# Patient Record
Sex: MALE | Race: WHITE | NOT HISPANIC OR LATINO | URBAN - METROPOLITAN AREA
[De-identification: names, ages, dates, MRNs, and addresses within clinical notes are randomized per-mention and may not be internally consistent; named-entity substitution may affect disease eponyms.]

---

## 2024-04-03 ENCOUNTER — NURSING HOME VISIT (OUTPATIENT)
Dept: POST ACUTE CARE | Facility: EXTERNAL LOCATION | Age: 69
End: 2024-04-03
Payer: COMMERCIAL

## 2024-04-03 DIAGNOSIS — C78.7 PROSTATE CANCER METASTATIC TO LIVER (MULTI): ICD-10-CM

## 2024-04-03 DIAGNOSIS — J44.9 CHRONIC OBSTRUCTIVE PULMONARY DISEASE, UNSPECIFIED COPD TYPE (MULTI): ICD-10-CM

## 2024-04-03 DIAGNOSIS — T40.2X5A CONSTIPATION DUE TO OPIOID THERAPY: ICD-10-CM

## 2024-04-03 DIAGNOSIS — C61 PROSTATE CANCER METASTATIC TO LIVER (MULTI): ICD-10-CM

## 2024-04-03 DIAGNOSIS — E44.0 MODERATE PROTEIN-CALORIE MALNUTRITION (MULTI): ICD-10-CM

## 2024-04-03 DIAGNOSIS — C79.31 METASTASIS TO BRAIN (MULTI): ICD-10-CM

## 2024-04-03 DIAGNOSIS — K59.03 CONSTIPATION DUE TO OPIOID THERAPY: ICD-10-CM

## 2024-04-03 DIAGNOSIS — R53.83 LETHARGY: ICD-10-CM

## 2024-04-03 DIAGNOSIS — R62.7 FAILURE TO THRIVE IN ADULT: Primary | ICD-10-CM

## 2024-04-03 DIAGNOSIS — F31.31 BIPOLAR 1 DISORDER, DEPRESSED, MILD (MULTI): ICD-10-CM

## 2024-04-03 DIAGNOSIS — R53.1 WEAKNESS: ICD-10-CM

## 2024-04-03 DIAGNOSIS — D64.9 ANEMIA, UNSPECIFIED TYPE: ICD-10-CM

## 2024-04-03 DIAGNOSIS — I10 HYPERTENSION, UNSPECIFIED TYPE: ICD-10-CM

## 2024-04-03 DIAGNOSIS — E78.5 HYPERLIPIDEMIA, UNSPECIFIED HYPERLIPIDEMIA TYPE: ICD-10-CM

## 2024-04-03 DIAGNOSIS — E03.9 HYPOTHYROIDISM, UNSPECIFIED TYPE: ICD-10-CM

## 2024-04-03 PROCEDURE — 99310 SBSQ NF CARE HIGH MDM 45: CPT | Performed by: NURSE PRACTITIONER

## 2024-04-03 NOTE — LETTER
Patient: Jose Antonio Gonzales  : 1955    Encounter Date: 2024    Chief Complaint:   SNF F/U  Generalized weakness  FTT  Metastatic prostate cancer     HPI:   68 year-old male presenting to the ER on 3/26/24 with c/o generalized weakness and failure to thrive. He was admitted to the hospital for further evaluation and treatment. Hospital course:    FTT/generalized weakness-oncology consult, palliative care consult, MRI of brain and spine showed new 7 mm enhancing mass in the basal posterior left temporal region and mild surrounding vasogenic edema, MRI of lumbar spine showing small lesion in L1 vertebral body, neurology consulted with no new recommendations, F/U with oncology and radiation oncology after discharge, PT/OT recommending SNF  Insomnia-trazodone prn   Metastatic prostate cancer-known mets to liver and brain, oncology following, F/U with radiation oncology as scheduled  Bipolar disorder-c/w lithium, seroquel, and zyprexa   Hypothyroidism-c/w levothyroxine, endo consult, monitor thyroid studies, F/U with endo after discharge  Anemia-monitor CBC  Abdominal pain-XR noted with fecal burden, bowel regimen started  Moderate protein-calorie malnutrition-RD consult    Pt. was HDS and discharged to [facility] on 24. Today, patient's wife is at the bedside and reports that patient is very tired and lethargic compared to yesterday. He was unable to participate in therapy today. He denies any dizziness, vision changes, SOB, cough, or chest pain. He reports generalized pain. Wife reports that she brought in patient's medications from home and left them at the beside and thinks that patient may have taken his Trazodone when he was already receiving the Trazodone from the facility. Pt. is unable to state whether or not this happened. No other clinical concerns noted at this time.     ROS:    As above in HPI. Otherwise, all other systems have been reviewed and are negative for complaint.    Medications  reviewed and verified in NH chart.     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)   • Moderate protein-calorie malnutrition (Multi)   • Lymphadenopathy        Past Medical History:   Diagnosis Date   • Colon polyps    • Inguinal hernia    • Pneumonia        Past Surgical History:   Procedure Laterality Date   • APPENDECTOMY     • COLONOSCOPY     • ESOPHAGOGASTRODUODENOSCOPY     • HERNIA REPAIR      Inguinal hernia repair   • POLYPECTOMY     • PROSTATECTOMY  01/31/2014   • RIB BIOPSY      R 8th rib biopsy   • SINUS SURGERY      Sinus polyp excision       Family History   Problem Relation Name Age of Onset   • Lung cancer Mother     • ALS Father         Social History     Tobacco Use   Smoking Status Never   Smokeless Tobacco Never       Social History     Substance and Sexual Activity   Alcohol Use Never       Social History     Substance and Sexual Activity   Drug Use Never       No Known Allergies     Vital Signs:   118/86-93-20-98.6-93% on RA     Physical Exam:  General: Lying in bed in NAD, drowsy, opens eyes to verbal command  Head/Face: NCAT, symmetrical  Eyes: PERRLA, no injection, no discharge  ENT: Hearing not impaired, ears without scars or lesions, nasal mucosa and turbinates pink, septum midline, lips pink and moist  Neck: Supple, symmetrical  Respiratory: CTA but diminished without adventitious sounds, respirations even and nonlabored without use of accessory muscles, good air exchange  Cardio:  RRR without murmur or gallops, normal S1S2, no edema, pedal pulses 3+/4 bilaterally  Chest/Breast: Symmetrical  GI: BS x 4, normoactive, non-distended, abd round and soft, no masses or tenderness  : No suprapubic tenderness or distention  MSK: Gait not assessed, joints with full ROM without pain or contractures, + generalized weakness  Skin: Skin warm and dry, no induration  Neurologic: Cranial nerves II through XII intact, superficial touch and pain sensation intact  Psychiatric: Alert, oriented x 2-3, calm and cooperative, drowsy    Results/Data:   4/1/24: Glu 102, Alb 3.6, Hgb 10.7, Hct 31.8, T3 21, FT4 0.6  3/28/24: Lithium level 0.1  3/18/24: TSH 78.11    Assessment/Plan:  Generalized weakness/failure to thrive/protein-calorie malnutrition-c/w PT/OT, safety and fall precautions, encourage PO intake, monitor electrolytes, RD consult, monitor weights  Prostate cancer with mets to liver and brain/small cell neuroendocrine carcinoma of lung-c/w supportive care, Dilaudid prn for pain, F/U with radiation oncology and oncology as scheduled  Lethargy-possible 2/2 double trazodone dose, push fluids when more alert, check CBC w/ diff, BMP, LFTs, and ammonia level on 4/4, hold sleeping pills this evening, monitor mental status closely  Constipation-encourage fluids and fiber, c/w lactulose, miralax, and senna, monitor for BMs  Anemia-c/w MVI, monitor CBC  Hypothyroidism-c/w levothyroxine, monitor TSH and FT4, F/U with endocrinology after discharge  Bipolar depressive disorder/insomnia-c/w lithium, zyprexa, and seroquel, hold trazodone and zalepion tonight, monitor behaviors  Vitamin deficiency-c/w MVI and calcium/D supplements  COPD-c/w Symbicort and singulair, c/w Albuterol prn, monitor respiratory status closely  HTN/HLD-monitor BP, monitor lipid panel     Orders:  CBC w/ diff and BMP Q Thursday x 6 weeks  Ammonia level and hepatic function panel on 4/4/24    Code Status:   DNR-CCA    Time spent reviewing patient's  chart on the unit (PMH, PSH, FH, Social Hx AND progress and/or consult notes, labs, and radiology results): 28 minutes  Time spent interviewing and/or examining the patient: 10 minutes  Time spent writing note on the unit: 5 minutes  Time spent reviewing POC w/ patient, family, and/or staff: 10 minutes  Total visit time: 53 minutes. Greater than 50% of time was spent on counseling and/or coordination of care of the patient. Start time: 4:30 PM, End time: 5:23 PM.          Electronically Signed By: VALERY Medina   4/30/24  9:10 AM

## 2024-04-04 ENCOUNTER — NURSING HOME VISIT (OUTPATIENT)
Dept: POST ACUTE CARE | Facility: EXTERNAL LOCATION | Age: 69
End: 2024-04-04
Payer: COMMERCIAL

## 2024-04-04 DIAGNOSIS — E44.0 MODERATE PROTEIN-CALORIE MALNUTRITION (MULTI): ICD-10-CM

## 2024-04-04 DIAGNOSIS — C78.7 PROSTATE CANCER METASTATIC TO LIVER (MULTI): ICD-10-CM

## 2024-04-04 DIAGNOSIS — J44.9 CHRONIC OBSTRUCTIVE PULMONARY DISEASE, UNSPECIFIED COPD TYPE (MULTI): ICD-10-CM

## 2024-04-04 DIAGNOSIS — D3A.8 NEUROENDOCRINE TUMOR (CMS-HCC): ICD-10-CM

## 2024-04-04 DIAGNOSIS — R62.7 FAILURE TO THRIVE IN ADULT: ICD-10-CM

## 2024-04-04 DIAGNOSIS — C7A.1: Chronic | ICD-10-CM

## 2024-04-04 DIAGNOSIS — F31.31 BIPOLAR 1 DISORDER, DEPRESSED, MILD (MULTI): ICD-10-CM

## 2024-04-04 DIAGNOSIS — E03.9 HYPOTHYROIDISM, UNSPECIFIED TYPE: ICD-10-CM

## 2024-04-04 DIAGNOSIS — C61 PROSTATE CANCER METASTATIC TO LIVER (MULTI): ICD-10-CM

## 2024-04-04 DIAGNOSIS — C79.31 METASTASIS TO BRAIN (MULTI): ICD-10-CM

## 2024-04-04 PROCEDURE — 99306 1ST NF CARE HIGH MDM 50: CPT | Performed by: INTERNAL MEDICINE

## 2024-04-04 NOTE — LETTER
"Patient: Jose Antonio Gonzales  : 1955    Encounter Date: 2024    Name: Jose Antonio Gonzales  : 1955  MRN: [id number]  Visit Date: 2024  Chief Complaint: HISTORY AND PHYSICAL    HPI: 68 year old male with COPD, hypertension, bipolar disorder, prostate cancer, hypothyroidism, anemia who was admitted to the hospital with chief complaint of failure to thrive, fatigue and weakness. Oncology and Palliative was consulted. OP FU with Radiation Oncology. Neuro saw pt without any new recommendations. Pt has Metastatic prostate cancer s/p prostatectomy with known metastasis to liver and brain, patient is currently not on any chemo was being planned for radiation to the new liver lesion as per last oncology note from 3/18. MRI brain and spine showed new 7mm enhancing mass in the basal posterior left temporal region. Mild surrounding vasogenic edema. MRI Lumbar Spine showed small lesion in L1 vertebral body. Per oncology - \"Discussed MRI results with Dr Heard - he will discuss brain lesion with Gamma, L spine and liver RT will be reviewed with APPT with Dr Nguyen 24.\" Once stabilized, pt was brought to [facility] on 2024 for ongoing med mgt and therapy services.    Subjective: Seen and examined today. Sitting up in bed. Reviewed hospitalization with him. Questions answered and understanding verbalized.     ROS:  As above in subjective. Otherwise, all other systems have been reviewed and are negative for complaint.    Current Outpatient Medications   Medication Instructions   • albuterol 90 mcg/actuation inhaler 2 puffs, inhalation, Every 4 hours PRN   • budesonide-formoteroL (Symbicort) 80-4.5 mcg/actuation inhaler 2 puffs, inhalation, 2 times daily   • levothyroxine (Synthroid, Levoxyl) 137 mcg tablet 1 tablet, oral, Daily before breakfast   • montelukast (Singulair) 10 mg tablet 1 tablet, oral, Nightly   • OLANZapine (ZyPREXA) 2.5 mg tablet 2 tablets, oral, Nightly   • polyethylene glycol (GLYCOLAX, MIRALAX) 17 g, " oral, Daily RT   • QUEtiapine (SEROQUEL) 300 mg, oral, Nightly   • sennosides (Senokot) 8.6 mg tablet 2 tablets, oral, 2 times daily   • traZODone (DESYREL) 200 mg, oral, Nightly PRN   • zaleplon (SONATA) 10 mg, oral, Nightly      Past Medical History:   Diagnosis Date   • Alcohol abuse 04/12/2024   • Anemia 07/20/2021   • Bipolar 1 disorder, depressed, mild (Multi) 07/29/2023   • Chronic obstructive pulmonary disease (Multi) 01/04/2012   • Constipation due to opioid therapy 07/31/2023   • Depression 07/29/2023   • Eaton-Lambert myasthenic syndrome (Multi) 03/27/2024   • Failure to thrive in adult 07/24/2023    Formatting of this note might be different from the original.   PT OT consult    Nutrition consult    consult although family hopes palliative medicine consult   • History of prostate cancer 03/30/2024   • Hyperlipidemia 12/08/2017   • Hypertension 04/12/2024   • Hypothyroid 12/08/2017   • Infection of parotid gland 07/27/2023    Formatting of this note might be different from the original.   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   consult oncology team   Consider palliative medicine consult     Past Surgical History:   Procedure Laterality Date   • APPENDECTOMY     • HERNIA REPAIR     • PROSTATE SURGERY       Family History   Problem Relation Name Age of Onset   • Lung cancer Mother     • ALS Father       Social History     Tobacco Use   • Smoking status: Never   • Smokeless tobacco: Never   Substance Use Topics   • Alcohol use: Never     Visit Vitals  /72   Pulse 78   Temp 36.4 °C (97.5 °F)   Resp 20   Wt  61.7 kg (136 lb)   SpO2 97%   Smoking Status Never      Physical Exam  Vitals reviewed.   Constitutional:       General: He is not in acute distress.     Appearance: Normal appearance. He is not ill-appearing.   HENT:      Head: Normocephalic and atraumatic.      Right Ear: Tympanic membrane and external ear normal.      Left Ear: Tympanic membrane and external ear normal.      Nose: Nose normal.      Mouth/Throat:      Mouth: Mucous membranes are moist.      Pharynx: Oropharynx is clear.   Eyes:      Conjunctiva/sclera: Conjunctivae normal.      Pupils: Pupils are equal, round, and reactive to light.   Cardiovascular:      Rate and Rhythm: Normal rate and regular rhythm.      Heart sounds: Normal heart sounds. No murmur heard.  Pulmonary:      Effort: Pulmonary effort is normal. No respiratory distress.      Breath sounds: Normal breath sounds. No wheezing.   Abdominal:      General: There is no distension.      Palpations: Abdomen is soft. There is no mass.      Tenderness: There is no abdominal tenderness.   Musculoskeletal:         General: Normal range of motion.      Cervical back: Normal range of motion and neck supple.   Skin:     General: Skin is warm and dry.   Neurological:      General: No focal deficit present.      Mental Status: He is alert and oriented to person, place, and time.      Sensory: No sensory deficit.      Motor: No weakness.      Coordination: Coordination normal.      Gait: Gait normal.   Psychiatric:         Mood and Affect: Mood normal.         Behavior: Behavior normal.       Lab Results   Component Value Date    WBC 4.3 (L) 04/11/2024    HGB 10.0 (L) 04/11/2024    HCT 30.7 (L) 04/11/2024     04/11/2024    ALT 10 04/04/2024    AST 9 04/04/2024     04/11/2024    K 4.2 04/11/2024     04/11/2024    CREATININE 0.81 04/11/2024    BUN 12 04/11/2024    CO2 24 04/11/2024    TSH 1.52 11/21/2019    INR 1.0 06/09/2020    HGBA1C 4.8 01/08/2024     Results were reviewed and  "addressed accordingly.     Provider Impression:   Failure to thrive, Weakness  - Oncology and Palliative consulted during hospitalization  - MRI brain and spine revealed a new 7 mm enhancing mass in the basal posterior left temporal region. Mild surrounding vasogenic edema.   - MRI of lumbar spine- small lesion in L1 vertebral body   - pt needs outpatient follow up with Radiation oncology and Oncology to discuss options   - Neurology consulted. No new rec's.  - consult PT OT to eval and tx     Insomnia  - continue trazodone as needed  - Palliative was consulted during hospitalization     Metastatic prostate cancer s/p prostatectomy  - NEW small lesion in L1 vertebral body noted on imaging  - Known metastasis to liver and brain  - patient is currently not on any chemo was being planned for radiation to the new liver lesion as per last oncology note from 3/18.  - OP FU with oncology and radiation oncology outpatient   - Per oncology - \"Discussed MRI results with Dr Heard - he will discuss brain lesion with Gamma, L spine and liver RT will be reviewed with APPT with Dr Nguyen 04/09/24 \"     Bipolar disorder  - on lithium 300 mg daily, Seroquel 300 mg and olanzapine 5 mg     H/o COPD  - as needed nebs     Hypothyroidism  - TSH high with low T3 level  - continue Synthyroid  -  Patient and wife not sure if taking meds properly at home  - FU with Endo as OP     Recent Abdominal pain 2/2 constipation  - continue with Bowel regimen.     Moderate Protein-Calorie Malnutrition  - encourage PO intake  - monitor intake and output  - consult dietician  - continue with supplementation    Code Status  - DNRCCA    ----------------  Written by Lubna Jimenez RN, acting as a scribe for Dr. Solis. This note accurately reflects the work and decisions made by Dr. Solis.     I, Dr. Solis, attest all medical record entries made by the scribe were under my direction and were personally dictated by me. I have reviewed the chart and agree that " the record accurately reflects my performance of the history, physical exam, and assessment and plan.     Electronically Signed By: Nuvia Leblanc MD   4/15/24  8:15 AM

## 2024-04-11 ENCOUNTER — NURSING HOME VISIT (OUTPATIENT)
Dept: POST ACUTE CARE | Facility: EXTERNAL LOCATION | Age: 69
End: 2024-04-11
Payer: COMMERCIAL

## 2024-04-11 DIAGNOSIS — E44.0 MODERATE PROTEIN-CALORIE MALNUTRITION (MULTI): ICD-10-CM

## 2024-04-11 DIAGNOSIS — C7A.1: ICD-10-CM

## 2024-04-11 DIAGNOSIS — C61 PROSTATE CANCER METASTATIC TO LIVER (MULTI): ICD-10-CM

## 2024-04-11 DIAGNOSIS — F31.31 BIPOLAR 1 DISORDER, DEPRESSED, MILD (MULTI): ICD-10-CM

## 2024-04-11 DIAGNOSIS — C78.7 PROSTATE CANCER METASTATIC TO LIVER (MULTI): ICD-10-CM

## 2024-04-11 DIAGNOSIS — J44.9 CHRONIC OBSTRUCTIVE PULMONARY DISEASE, UNSPECIFIED COPD TYPE (MULTI): ICD-10-CM

## 2024-04-11 DIAGNOSIS — E03.9 HYPOTHYROIDISM, UNSPECIFIED TYPE: ICD-10-CM

## 2024-04-11 DIAGNOSIS — D3A.8 NEUROENDOCRINE TUMOR (CMS-HCC): ICD-10-CM

## 2024-04-11 DIAGNOSIS — C79.31 METASTASIS TO BRAIN (MULTI): ICD-10-CM

## 2024-04-11 DIAGNOSIS — R62.7 FAILURE TO THRIVE IN ADULT: ICD-10-CM

## 2024-04-11 PROCEDURE — 99308 SBSQ NF CARE LOW MDM 20: CPT | Performed by: INTERNAL MEDICINE

## 2024-04-11 NOTE — LETTER
"Patient: Jose Antonio Gonzales  : 1955    Encounter Date: 2024    Name: Jose Antonio Gonzales  : 1955  MRN: [id number]  Visit Date: 2024  Chief Complaint: Weekly SNF Physician Visit    HPI: 68 year old male with COPD, hypertension, bipolar disorder, prostate cancer, hypothyroidism, anemia who was admitted to the hospital with chief complaint of failure to thrive, fatigue and weakness. Oncology and Palliative was consulted. OP FU with Radiation Oncology. Neuro saw pt without any new recommendations. Pt has Metastatic prostate cancer s/p prostatectomy with known metastasis to liver and brain, patient is currently not on any chemo was being planned for radiation to the new liver lesion as per last oncology note from 3/18. MRI brain and spine showed new 7mm enhancing mass in the basal posterior left temporal region. Mild surrounding vasogenic edema. MRI Lumbar Spine showed small lesion in L1 vertebral body. Per oncology - \"Discussed MRI results with Dr Heard - he will discuss brain lesion with Gamma, L spine and liver RT will be reviewed with APPT with Dr Nguyen 24.\" Once stabilized, pt was brought to [facility] on 2024 for ongoing med mgt and therapy services.    Subjective: Seen and examined today. Sitting up in bed. Alert and oriented. Denies N/V/D/C/CP. No fever or chills. Nursing reports no issues. I have reviewed nursing notes since my last visit and document any significant changes Reviewed orders, medications, Labs. Reviewed chart looking at current medications, treatments, labs, x-rays etc.     ROS:  As above in subjective. Otherwise, all other systems have been reviewed and are negative for complaint.    Current Outpatient Medications   Medication Instructions   • albuterol 90 mcg/actuation inhaler 2 puffs, inhalation, Every 4 hours PRN   • budesonide-formoteroL (Symbicort) 80-4.5 mcg/actuation inhaler 2 puffs, inhalation, 2 times daily   • levothyroxine (Synthroid, Levoxyl) 137 mcg tablet 1 " tablet, oral, Daily before breakfast   • montelukast (Singulair) 10 mg tablet 1 tablet, oral, Nightly   • OLANZapine (ZyPREXA) 2.5 mg tablet 2 tablets, oral, Nightly   • polyethylene glycol (GLYCOLAX, MIRALAX) 17 g, oral, Daily RT   • QUEtiapine (SEROQUEL) 300 mg, oral, Nightly   • sennosides (Senokot) 8.6 mg tablet 2 tablets, oral, 2 times daily   • traZODone (DESYREL) 200 mg, oral, Nightly PRN   • zaleplon (SONATA) 10 mg, oral, Nightly      Physical Exam  Vitals reviewed.   Constitutional:       General: He is not in acute distress.     Appearance: Normal appearance. He is not ill-appearing.   HENT:      Head: Normocephalic and atraumatic.      Right Ear: Tympanic membrane and external ear normal.      Left Ear: Tympanic membrane and external ear normal.      Nose: Nose normal.      Mouth/Throat:      Mouth: Mucous membranes are moist.      Pharynx: Oropharynx is clear.   Eyes:      Conjunctiva/sclera: Conjunctivae normal.      Pupils: Pupils are equal, round, and reactive to light.   Cardiovascular:      Rate and Rhythm: Normal rate and regular rhythm.      Heart sounds: Normal heart sounds. No murmur heard.  Pulmonary:      Effort: Pulmonary effort is normal. No respiratory distress.      Breath sounds: Normal breath sounds. No wheezing.   Abdominal:      General: There is no distension.      Palpations: Abdomen is soft. There is no mass.      Tenderness: There is no abdominal tenderness.   Musculoskeletal:         General: Normal range of motion.      Cervical back: Normal range of motion and neck supple.   Skin:     General: Skin is warm and dry.   Neurological:      General: No focal deficit present.      Mental Status: He is alert and oriented to person, place, and time.      Sensory: No sensory deficit.      Motor: No weakness.      Coordination: Coordination normal.      Gait: Gait normal.   Psychiatric:         Mood and Affect: Mood normal.         Behavior: Behavior normal.       Lab Results   Component  "Value Date    WBC 4.3 (L) 04/11/2024    HGB 10.0 (L) 04/11/2024    HCT 30.7 (L) 04/11/2024     04/11/2024    ALT 10 04/04/2024    AST 9 04/04/2024     04/11/2024    K 4.2 04/11/2024     04/11/2024    CREATININE 0.81 04/11/2024    BUN 12 04/11/2024    CO2 24 04/11/2024    TSH 1.52 11/21/2019    INR 1.0 06/09/2020    HGBA1C 4.8 01/08/2024     Results were reviewed and addressed accordingly.     Provider Impression:   Failure to thrive, Weakness  - Oncology and Palliative consulted during hospitalization  - MRI brain and spine revealed a new 7 mm enhancing mass in the basal posterior left temporal region. Mild surrounding vasogenic edema.   - MRI of lumbar spine- small lesion in L1 vertebral body   - pt needs outpatient follow up with Radiation oncology and Oncology to discuss options   - Neurology consulted. No new rec's.  - consult PT OT to eval and tx     Insomnia  - continue trazodone as needed  - Palliative was consulted during hospitalization     Metastatic prostate cancer s/p prostatectomy  - NEW small lesion in L1 vertebral body noted on imaging  - Known metastasis to liver and brain  - patient is currently not on any chemo was being planned for radiation to the new liver lesion as per last oncology note from 3/18.  - OP FU with oncology and radiation oncology outpatient   - Per oncology - \"Discussed MRI results with Dr Heard - he will discuss brain lesion with Gamma, L spine and liver RT will be reviewed with APPT with Dr Nguyen 04/09/24 \"     Bipolar disorder  - on lithium 300 mg daily, Seroquel 300 mg and olanzapine 5 mg     H/o COPD  - as needed nebs     Hypothyroidism  - TSH high with low T3 level  - continue Synthyroid  -  Patient and wife not sure if taking meds properly at home  - FU with Endo as OP     Recent Abdominal pain 2/2 constipation  - continue with Bowel regimen.     Moderate Protein-Calorie Malnutrition  - encourage PO intake  - monitor intake and output  - consult " dietician  - continue with supplementation    Code Status  - DNRCCA    ----------------  Written by Lubna Jimenez RN, acting as a scribe for Dr. Solis. This note accurately reflects the work and decisions made by Dr. Solis.     I, Dr. Solis, attest all medical record entries made by the scribe were under my direction and were personally dictated by me. I have reviewed the chart and agree that the record accurately reflects my performance of the history, physical exam, and assessment and plan.       Electronically Signed By: Nuvia Leblanc MD   4/16/24  9:06 AM

## 2024-04-12 VITALS
OXYGEN SATURATION: 97 % | RESPIRATION RATE: 20 BRPM | WEIGHT: 136 LBS | DIASTOLIC BLOOD PRESSURE: 72 MMHG | TEMPERATURE: 97.5 F | HEART RATE: 78 BPM | SYSTOLIC BLOOD PRESSURE: 128 MMHG

## 2024-04-12 PROBLEM — F31.31 BIPOLAR 1 DISORDER, DEPRESSED, MILD (MULTI): Status: ACTIVE | Noted: 2023-07-29

## 2024-04-12 PROBLEM — F10.10 ALCOHOL ABUSE: Status: ACTIVE | Noted: 2024-04-12

## 2024-04-12 PROBLEM — D64.9 ANEMIA: Status: ACTIVE | Noted: 2021-07-20

## 2024-04-12 PROBLEM — G70.80: Status: ACTIVE | Noted: 2024-03-27

## 2024-04-12 PROBLEM — D3A.8 NEUROENDOCRINE TUMOR (CMS-HCC): Status: ACTIVE | Noted: 2023-07-25

## 2024-04-12 PROBLEM — K11.20 INFECTION OF PAROTID GLAND: Status: ACTIVE | Noted: 2023-07-27

## 2024-04-12 PROBLEM — T40.2X5A CONSTIPATION DUE TO OPIOID THERAPY: Status: ACTIVE | Noted: 2023-07-31

## 2024-04-12 PROBLEM — Z85.46 HISTORY OF PROSTATE CANCER: Status: ACTIVE | Noted: 2024-03-30

## 2024-04-12 PROBLEM — I73.9 PAD (PERIPHERAL ARTERY DISEASE) (CMS-HCC): Status: ACTIVE | Noted: 2021-06-18

## 2024-04-12 PROBLEM — C61: Status: ACTIVE | Noted: 2023-07-31

## 2024-04-12 PROBLEM — E78.5 HYPERLIPIDEMIA: Status: ACTIVE | Noted: 2017-12-08

## 2024-04-12 PROBLEM — C78.7: Status: ACTIVE | Noted: 2023-07-31

## 2024-04-12 PROBLEM — R62.7 FAILURE TO THRIVE IN ADULT: Status: ACTIVE | Noted: 2023-07-24

## 2024-04-12 PROBLEM — K59.03 CONSTIPATION DUE TO OPIOID THERAPY: Status: ACTIVE | Noted: 2023-07-31

## 2024-04-12 PROBLEM — C79.31 METASTASIS TO BRAIN (MULTI): Status: ACTIVE | Noted: 2023-07-31

## 2024-04-12 PROBLEM — C7A.1: Chronic | Status: ACTIVE | Noted: 2022-04-07

## 2024-04-12 PROBLEM — F32.A DEPRESSION: Status: ACTIVE | Noted: 2023-07-29

## 2024-04-12 PROBLEM — I10 HYPERTENSION: Status: ACTIVE | Noted: 2024-04-12

## 2024-04-12 PROBLEM — E03.9 HYPOTHYROID: Status: ACTIVE | Noted: 2017-12-08

## 2024-04-12 PROBLEM — R64: Status: ACTIVE | Noted: 2024-01-03

## 2024-04-12 RX ORDER — OLANZAPINE 2.5 MG/1
2 TABLET ORAL NIGHTLY
COMMUNITY
Start: 2024-03-04 | End: 2024-06-02

## 2024-04-12 RX ORDER — ZALEPLON 10 MG/1
10 CAPSULE ORAL NIGHTLY
COMMUNITY
Start: 2019-05-14

## 2024-04-12 RX ORDER — MONTELUKAST SODIUM 10 MG/1
1 TABLET ORAL NIGHTLY
COMMUNITY
Start: 2014-02-28

## 2024-04-12 RX ORDER — QUETIAPINE FUMARATE 300 MG/1
300 TABLET, FILM COATED ORAL NIGHTLY
COMMUNITY
Start: 2022-03-16

## 2024-04-12 RX ORDER — POLYETHYLENE GLYCOL 3350 17 G/17G
17 POWDER, FOR SOLUTION ORAL
COMMUNITY
Start: 2024-04-02

## 2024-04-12 RX ORDER — LEVOTHYROXINE SODIUM 137 UG/1
1 TABLET ORAL
COMMUNITY
Start: 2024-01-08

## 2024-04-12 RX ORDER — TRAZODONE HYDROCHLORIDE 100 MG/1
200 TABLET ORAL NIGHTLY PRN
COMMUNITY
Start: 2022-03-03

## 2024-04-12 RX ORDER — ALBUTEROL SULFATE 90 UG/1
2 AEROSOL, METERED RESPIRATORY (INHALATION) EVERY 4 HOURS PRN
COMMUNITY
Start: 2022-06-10

## 2024-04-12 RX ORDER — SENNOSIDES 8.6 MG/1
2 TABLET ORAL 2 TIMES DAILY
COMMUNITY
Start: 2024-04-01

## 2024-04-12 RX ORDER — BUDESONIDE AND FORMOTEROL FUMARATE DIHYDRATE 80; 4.5 UG/1; UG/1
2 AEROSOL RESPIRATORY (INHALATION) 2 TIMES DAILY
COMMUNITY
Start: 2023-02-28

## 2024-04-12 NOTE — PROGRESS NOTES
"Name: Jose Antonio Gonzales  : 1955  MRN: [id number]  Visit Date: 2024  Chief Complaint: HISTORY AND PHYSICAL    HPI: 68 year old male with COPD, hypertension, bipolar disorder, prostate cancer, hypothyroidism, anemia who was admitted to the hospital with chief complaint of failure to thrive, fatigue and weakness. Oncology and Palliative was consulted. OP FU with Radiation Oncology. Neuro saw pt without any new recommendations. Pt has Metastatic prostate cancer s/p prostatectomy with known metastasis to liver and brain, patient is currently not on any chemo was being planned for radiation to the new liver lesion as per last oncology note from 3/18. MRI brain and spine showed new 7mm enhancing mass in the basal posterior left temporal region. Mild surrounding vasogenic edema. MRI Lumbar Spine showed small lesion in L1 vertebral body. Per oncology - \"Discussed MRI results with Dr Heard - he will discuss brain lesion with Gamma, L spine and liver RT will be reviewed with APPT with Dr Nguyen 24.\" Once stabilized, pt was brought to [facility] on 2024 for ongoing med mgt and therapy services.    Subjective: Seen and examined today. Sitting up in bed. Reviewed hospitalization with him. Questions answered and understanding verbalized.     ROS:  As above in subjective. Otherwise, all other systems have been reviewed and are negative for complaint.    Current Outpatient Medications   Medication Instructions    albuterol 90 mcg/actuation inhaler 2 puffs, inhalation, Every 4 hours PRN    budesonide-formoteroL (Symbicort) 80-4.5 mcg/actuation inhaler 2 puffs, inhalation, 2 times daily    levothyroxine (Synthroid, Levoxyl) 137 mcg tablet 1 tablet, oral, Daily before breakfast    montelukast (Singulair) 10 mg tablet 1 tablet, oral, Nightly    OLANZapine (ZyPREXA) 2.5 mg tablet 2 tablets, oral, Nightly    polyethylene glycol (GLYCOLAX, MIRALAX) 17 g, oral, Daily RT    QUEtiapine (SEROQUEL) 300 mg, oral, Nightly    sennosides " (Senokot) 8.6 mg tablet 2 tablets, oral, 2 times daily    traZODone (DESYREL) 200 mg, oral, Nightly PRN    zaleplon (SONATA) 10 mg, oral, Nightly      Past Medical History:   Diagnosis Date    Alcohol abuse 04/12/2024    Anemia 07/20/2021    Bipolar 1 disorder, depressed, mild (Multi) 07/29/2023    Chronic obstructive pulmonary disease (Multi) 01/04/2012    Constipation due to opioid therapy 07/31/2023    Depression 07/29/2023    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   PT OT consult    Nutrition consult    consult although family hopes palliative medicine consult    History of prostate cancer 03/30/2024    Hyperlipidemia 12/08/2017    Hypertension 04/12/2024    Hypothyroid 12/08/2017    Infection of parotid gland 07/27/2023    Formatting of this note might be different from the original.   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   consult oncology team   Consider palliative medicine consult     Past Surgical History:   Procedure Laterality Date    APPENDECTOMY      HERNIA REPAIR      PROSTATE SURGERY       Family History   Problem Relation Name Age of Onset    Lung cancer Mother      ALS Father       Social History     Tobacco Use    Smoking status: Never    Smokeless tobacco: Never   Substance Use Topics    Alcohol use: Never     Visit Vitals  /72   Pulse 78   Temp 36.4 °C (97.5 °F)   Resp 20   Wt 61.7 kg (136 lb)   SpO2 97%   Smoking Status Never      Physical Exam  Vitals reviewed.   Constitutional:        General: He is not in acute distress.     Appearance: Normal appearance. He is not ill-appearing.   HENT:      Head: Normocephalic and atraumatic.      Right Ear: Tympanic membrane and external ear normal.      Left Ear: Tympanic membrane and external ear normal.      Nose: Nose normal.      Mouth/Throat:      Mouth: Mucous membranes are moist.      Pharynx: Oropharynx is clear.   Eyes:      Conjunctiva/sclera: Conjunctivae normal.      Pupils: Pupils are equal, round, and reactive to light.   Cardiovascular:      Rate and Rhythm: Normal rate and regular rhythm.      Heart sounds: Normal heart sounds. No murmur heard.  Pulmonary:      Effort: Pulmonary effort is normal. No respiratory distress.      Breath sounds: Normal breath sounds. No wheezing.   Abdominal:      General: There is no distension.      Palpations: Abdomen is soft. There is no mass.      Tenderness: There is no abdominal tenderness.   Musculoskeletal:         General: Normal range of motion.      Cervical back: Normal range of motion and neck supple.   Skin:     General: Skin is warm and dry.   Neurological:      General: No focal deficit present.      Mental Status: He is alert and oriented to person, place, and time.      Sensory: No sensory deficit.      Motor: No weakness.      Coordination: Coordination normal.      Gait: Gait normal.   Psychiatric:         Mood and Affect: Mood normal.         Behavior: Behavior normal.       Lab Results   Component Value Date    WBC 4.3 (L) 04/11/2024    HGB 10.0 (L) 04/11/2024    HCT 30.7 (L) 04/11/2024     04/11/2024    ALT 10 04/04/2024    AST 9 04/04/2024     04/11/2024    K 4.2 04/11/2024     04/11/2024    CREATININE 0.81 04/11/2024    BUN 12 04/11/2024    CO2 24 04/11/2024    TSH 1.52 11/21/2019    INR 1.0 06/09/2020    HGBA1C 4.8 01/08/2024     Results were reviewed and addressed accordingly.     Provider Impression:   Failure to thrive, Weakness  - Oncology and Palliative  "consulted during hospitalization  - MRI brain and spine revealed a new 7 mm enhancing mass in the basal posterior left temporal region. Mild surrounding vasogenic edema.   - MRI of lumbar spine- small lesion in L1 vertebral body   - pt needs outpatient follow up with Radiation oncology and Oncology to discuss options   - Neurology consulted. No new rec's.  - consult PT OT to eval and tx     Insomnia  - continue trazodone as needed  - Palliative was consulted during hospitalization     Metastatic prostate cancer s/p prostatectomy  - NEW small lesion in L1 vertebral body noted on imaging  - Known metastasis to liver and brain  - patient is currently not on any chemo was being planned for radiation to the new liver lesion as per last oncology note from 3/18.  - OP FU with oncology and radiation oncology outpatient   - Per oncology - \"Discussed MRI results with Dr Heard - he will discuss brain lesion with Gamma, L spine and liver RT will be reviewed with APPT with Dr Nguyen 04/09/24 \"     Bipolar disorder  - on lithium 300 mg daily, Seroquel 300 mg and olanzapine 5 mg     H/o COPD  - as needed nebs     Hypothyroidism  - TSH high with low T3 level  - continue Synthyroid  -  Patient and wife not sure if taking meds properly at home  - FU with Endo as OP     Recent Abdominal pain 2/2 constipation  - continue with Bowel regimen.     Moderate Protein-Calorie Malnutrition  - encourage PO intake  - monitor intake and output  - consult dietician  - continue with supplementation    Code Status  - DNRCCA    ----------------  Written by Lubna Jimenez RN, acting as a scribe for Dr. Solis. This note accurately reflects the work and decisions made by Dr. Solis.     I, Dr. Solis, attest all medical record entries made by the scribe were under my direction and were personally dictated by me. I have reviewed the chart and agree that the record accurately reflects my performance of the history, physical exam, and assessment and plan.   "

## 2024-04-14 PROBLEM — E44.0 MODERATE PROTEIN-CALORIE MALNUTRITION (MULTI): Status: ACTIVE | Noted: 2024-04-14

## 2024-04-15 NOTE — PROGRESS NOTES
"Name: Jose Antonio Gonzales  : 1955  MRN: [id number]  Visit Date: 2024  Chief Complaint: Weekly SNF Physician Visit    HPI: 68 year old male with COPD, hypertension, bipolar disorder, prostate cancer, hypothyroidism, anemia who was admitted to the hospital with chief complaint of failure to thrive, fatigue and weakness. Oncology and Palliative was consulted. OP FU with Radiation Oncology. Neuro saw pt without any new recommendations. Pt has Metastatic prostate cancer s/p prostatectomy with known metastasis to liver and brain, patient is currently not on any chemo was being planned for radiation to the new liver lesion as per last oncology note from 3/18. MRI brain and spine showed new 7mm enhancing mass in the basal posterior left temporal region. Mild surrounding vasogenic edema. MRI Lumbar Spine showed small lesion in L1 vertebral body. Per oncology - \"Discussed MRI results with Dr Heard - he will discuss brain lesion with Gamma, L spine and liver RT will be reviewed with APPT with Dr Nguyen 24.\" Once stabilized, pt was brought to [facility] on 2024 for ongoing med mgt and therapy services.    Subjective: Seen and examined today. Sitting up in bed. Alert and oriented. Denies N/V/D/C/CP. No fever or chills. Nursing reports no issues. I have reviewed nursing notes since my last visit and document any significant changes Reviewed orders, medications, Labs. Reviewed chart looking at current medications, treatments, labs, x-rays etc.     ROS:  As above in subjective. Otherwise, all other systems have been reviewed and are negative for complaint.    Current Outpatient Medications   Medication Instructions    albuterol 90 mcg/actuation inhaler 2 puffs, inhalation, Every 4 hours PRN    budesonide-formoteroL (Symbicort) 80-4.5 mcg/actuation inhaler 2 puffs, inhalation, 2 times daily    levothyroxine (Synthroid, Levoxyl) 137 mcg tablet 1 tablet, oral, Daily before breakfast    montelukast (Singulair) 10 mg tablet 1 " tablet, oral, Nightly    OLANZapine (ZyPREXA) 2.5 mg tablet 2 tablets, oral, Nightly    polyethylene glycol (GLYCOLAX, MIRALAX) 17 g, oral, Daily RT    QUEtiapine (SEROQUEL) 300 mg, oral, Nightly    sennosides (Senokot) 8.6 mg tablet 2 tablets, oral, 2 times daily    traZODone (DESYREL) 200 mg, oral, Nightly PRN    zaleplon (SONATA) 10 mg, oral, Nightly      Physical Exam  Vitals reviewed.   Constitutional:       General: He is not in acute distress.     Appearance: Normal appearance. He is not ill-appearing.   HENT:      Head: Normocephalic and atraumatic.      Right Ear: Tympanic membrane and external ear normal.      Left Ear: Tympanic membrane and external ear normal.      Nose: Nose normal.      Mouth/Throat:      Mouth: Mucous membranes are moist.      Pharynx: Oropharynx is clear.   Eyes:      Conjunctiva/sclera: Conjunctivae normal.      Pupils: Pupils are equal, round, and reactive to light.   Cardiovascular:      Rate and Rhythm: Normal rate and regular rhythm.      Heart sounds: Normal heart sounds. No murmur heard.  Pulmonary:      Effort: Pulmonary effort is normal. No respiratory distress.      Breath sounds: Normal breath sounds. No wheezing.   Abdominal:      General: There is no distension.      Palpations: Abdomen is soft. There is no mass.      Tenderness: There is no abdominal tenderness.   Musculoskeletal:         General: Normal range of motion.      Cervical back: Normal range of motion and neck supple.   Skin:     General: Skin is warm and dry.   Neurological:      General: No focal deficit present.      Mental Status: He is alert and oriented to person, place, and time.      Sensory: No sensory deficit.      Motor: No weakness.      Coordination: Coordination normal.      Gait: Gait normal.   Psychiatric:         Mood and Affect: Mood normal.         Behavior: Behavior normal.       Lab Results   Component Value Date    WBC 4.3 (L) 04/11/2024    HGB 10.0 (L) 04/11/2024    HCT 30.7 (L)  "04/11/2024     04/11/2024    ALT 10 04/04/2024    AST 9 04/04/2024     04/11/2024    K 4.2 04/11/2024     04/11/2024    CREATININE 0.81 04/11/2024    BUN 12 04/11/2024    CO2 24 04/11/2024    TSH 1.52 11/21/2019    INR 1.0 06/09/2020    HGBA1C 4.8 01/08/2024     Results were reviewed and addressed accordingly.     Provider Impression:   Failure to thrive, Weakness  - Oncology and Palliative consulted during hospitalization  - MRI brain and spine revealed a new 7 mm enhancing mass in the basal posterior left temporal region. Mild surrounding vasogenic edema.   - MRI of lumbar spine- small lesion in L1 vertebral body   - pt needs outpatient follow up with Radiation oncology and Oncology to discuss options   - Neurology consulted. No new rec's.  - consult PT OT to eval and tx     Insomnia  - continue trazodone as needed  - Palliative was consulted during hospitalization     Metastatic prostate cancer s/p prostatectomy  - NEW small lesion in L1 vertebral body noted on imaging  - Known metastasis to liver and brain  - patient is currently not on any chemo was being planned for radiation to the new liver lesion as per last oncology note from 3/18.  - OP FU with oncology and radiation oncology outpatient   - Per oncology - \"Discussed MRI results with Dr Heard - he will discuss brain lesion with Gamma, L spine and liver RT will be reviewed with APPT with Dr Nguyen 04/09/24 \"     Bipolar disorder  - on lithium 300 mg daily, Seroquel 300 mg and olanzapine 5 mg     H/o COPD  - as needed nebs     Hypothyroidism  - TSH high with low T3 level  - continue Synthyroid  -  Patient and wife not sure if taking meds properly at home  - FU with Endo as OP     Recent Abdominal pain 2/2 constipation  - continue with Bowel regimen.     Moderate Protein-Calorie Malnutrition  - encourage PO intake  - monitor intake and output  - consult dietician  - continue with supplementation    Code Status  - " DNCA    ----------------  Written by Lubna Jimenez RN, acting as a scribe for Dr. Solis. This note accurately reflects the work and decisions made by Dr. Solis.     I, Dr. Solis, attest all medical record entries made by the scribe were under my direction and were personally dictated by me. I have reviewed the chart and agree that the record accurately reflects my performance of the history, physical exam, and assessment and plan.

## 2024-04-29 PROBLEM — R59.1 LYMPHADENOPATHY: Status: ACTIVE | Noted: 2024-04-29

## 2024-04-30 NOTE — PROGRESS NOTES
Chief Complaint:   SNF F/U  Generalized weakness  FTT  Metastatic prostate cancer     HPI:   68 year-old male presenting to the ER on 3/26/24 with c/o generalized weakness and failure to thrive. He was admitted to the hospital for further evaluation and treatment. Hospital course:    FTT/generalized weakness-oncology consult, palliative care consult, MRI of brain and spine showed new 7 mm enhancing mass in the basal posterior left temporal region and mild surrounding vasogenic edema, MRI of lumbar spine showing small lesion in L1 vertebral body, neurology consulted with no new recommendations, F/U with oncology and radiation oncology after discharge, PT/OT recommending SNF  Insomnia-trazodone prn   Metastatic prostate cancer-known mets to liver and brain, oncology following, F/U with radiation oncology as scheduled  Bipolar disorder-c/w lithium, seroquel, and zyprexa   Hypothyroidism-c/w levothyroxine, endo consult, monitor thyroid studies, F/U with endo after discharge  Anemia-monitor CBC  Abdominal pain-XR noted with fecal burden, bowel regimen started  Moderate protein-calorie malnutrition-RD consult    Pt. was HDS and discharged to [facility] on 4/1/24. Today, patient's wife is at the bedside and reports that patient is very tired and lethargic compared to yesterday. He was unable to participate in therapy today. He denies any dizziness, vision changes, SOB, cough, or chest pain. He reports generalized pain. Wife reports that she brought in patient's medications from home and left them at the beside and thinks that patient may have taken his Trazodone when he was already receiving the Trazodone from the facility. Pt. is unable to state whether or not this happened. No other clinical concerns noted at this time.     ROS:    As above in HPI. Otherwise, all other systems have been reviewed and are negative for complaint.    Medications reviewed and verified in NH chart.     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)    Moderate protein-calorie malnutrition (Multi)    Lymphadenopathy        Past Medical History:   Diagnosis Date    Colon polyps     Inguinal hernia     Pneumonia        Past Surgical History:   Procedure Laterality Date    APPENDECTOMY      COLONOSCOPY      ESOPHAGOGASTRODUODENOSCOPY      HERNIA REPAIR      Inguinal hernia repair    POLYPECTOMY      PROSTATECTOMY  01/31/2014    RIB BIOPSY      R 8th rib biopsy    SINUS SURGERY      Sinus polyp excision       Family History   Problem Relation Name Age of Onset    Lung cancer Mother      ALS Father         Social History     Tobacco Use   Smoking Status Never   Smokeless Tobacco Never       Social History     Substance and Sexual Activity   Alcohol Use Never       Social History     Substance and Sexual Activity   Drug Use Never       No Known Allergies     Vital Signs:   118/86-93-20-98.6-93% on RA     Physical Exam:  General: Lying in bed in NAD, drowsy, opens eyes to verbal command  Head/Face: NCAT, symmetrical  Eyes: PERRLA, no injection, no discharge  ENT: Hearing not impaired, ears without scars or lesions, nasal mucosa and turbinates pink, septum midline, lips pink and moist  Neck: Supple, symmetrical  Respiratory: CTA but diminished without adventitious sounds, respirations even and nonlabored without use of accessory muscles, good air exchange  Cardio: RRR without murmur or gallops, normal S1S2, no edema, pedal pulses 3+/4 bilaterally  Chest/Breast:  Symmetrical  GI: BS x 4, normoactive, non-distended, abd round and soft, no masses or tenderness  : No suprapubic tenderness or distention  MSK: Gait not assessed, joints with full ROM without pain or contractures, + generalized weakness  Skin: Skin warm and dry, no induration  Neurologic: Cranial nerves II through XII intact, superficial touch and pain sensation intact  Psychiatric: Alert, oriented x 2-3, calm and cooperative, drowsy    Results/Data:   4/1/24: Glu 102, Alb 3.6, Hgb 10.7, Hct 31.8, T3 21, FT4 0.6  3/28/24: Lithium level 0.1  3/18/24: TSH 78.11    Assessment/Plan:  Generalized weakness/failure to thrive/protein-calorie malnutrition-c/w PT/OT, safety and fall precautions, encourage PO intake, monitor electrolytes, RD consult, monitor weights  Prostate cancer with mets to liver and brain/small cell neuroendocrine carcinoma of lung-c/w supportive care, Dilaudid prn for pain, F/U with radiation oncology and oncology as scheduled  Lethargy-possible 2/2 double trazodone dose, push fluids when more alert, check CBC w/ diff, BMP, LFTs, and ammonia level on 4/4, hold sleeping pills this evening, monitor mental status closely  Constipation-encourage fluids and fiber, c/w lactulose, miralax, and senna, monitor for BMs  Anemia-c/w MVI, monitor CBC  Hypothyroidism-c/w levothyroxine, monitor TSH and FT4, F/U with endocrinology after discharge  Bipolar depressive disorder/insomnia-c/w lithium, zyprexa, and seroquel, hold trazodone and zalepion tonight, monitor behaviors  Vitamin deficiency-c/w MVI and calcium/D supplements  COPD-c/w Symbicort and singulair, c/w Albuterol prn, monitor respiratory status closely  HTN/HLD-monitor BP, monitor lipid panel     Orders:  CBC w/ diff and BMP Q Thursday x 6 weeks  Ammonia level and hepatic function panel on 4/4/24    Code Status:   DNR-CCA    Time spent reviewing patient's chart on the unit (PMH, PSH, FH, Social Hx AND progress and/or consult notes, labs, and radiology  results): 28 minutes  Time spent interviewing and/or examining the patient: 10 minutes  Time spent writing note on the unit: 5 minutes  Time spent reviewing POC w/ patient, family, and/or staff: 10 minutes  Total visit time: 53 minutes. Greater than 50% of time was spent on counseling and/or coordination of care of the patient. Start time: 4:30 PM, End time: 5:23 PM.